# Patient Record
(demographics unavailable — no encounter records)

---

## 2018-05-18 NOTE — ED PHYSICIAN CHART
ED Chief Complaint/HPI





- Patient Information


Date Seen:: 05/18/18


Time Seen:: 01:15


Chief Complaint:: epigastric pain


History of Present Illness:: 





Patient developed epigastric pain at [id number] tonight.  He vomited 3 times.  

He had no diarrhea.  Last similar pain was 14-15 years ago.


Allergies:: 


 Allergies











Allergy/AdvReac Type Severity Reaction Status Date / Time


 


No Known Allergies Allergy   Verified 05/18/18 01:16











Vitals:: 


 Vital Signs - 8 hr











  05/18/18





  01:10


 


Temp 97.6 F


 


HR 91


 


RR 17


 


/87


 


O2 Sat % 97











Historian:: Patient


Review:: Nurse's Note Reviewed





ED Review of Systems





- Review of Systems


General/Constitutional: No fever, No chills, No weight loss, No weakness, No 

diaphoresis, No edema, No loss of appetite


Skin: No skin lesions, No rash, No bruising


Head: No headache, No light-headedness


Eyes: No loss of vision, No pain, No diplopia


ENT: No earache, No nasal drainage, No sore throat, No tinnitus


Neck: No neck pain, No swelling, No thyromegaly, No stiffness, No mass noted


Cardio Vascular: No chest pain, No palpitations, No PND, No orthopnea, No edema


Pulmonary: No SOB, No cough, No sputum, No wheezing


GI: Nausea, Vomiting, Diarrhea, No pain, No melena, No hematochezia, No 

constipation, No hematemesis


G/U: No dysuria, No frequency, No hematuria


Musculoskeletal: No bone or joint pain, No back pain, No muscle pain


Endocrine: No polyuria, No polydipsia


Psychiatric: No prior psych history, No depression, No anxiety, No suicidal 

ideation


Hematopoietic: No bruising, No lymphadenopathy


Allergic/Immuno: No urticaria, No angioedema


Neurological: No syncope, No focal symptoms, No weakness, No paresthesia, No 

headache, No seizure, No dizziness, No confusion, No vertigo





ED Past Medical History





- Past Medical History


Past Medical History: No significant medical hx


Family History: Diabetes Melitus


Social History: Non Smoker, No Alcohol, Other (patient quit smoking 3 years ago)


Surgical History: None


Psychiatricy History: None





Family Medical History





- Family Member


  ** Mother


History Unknown: Yes





  ** Brother


Hx Family Diabetes: Yes





ED Physical Exam





- Physical Examination


General/Constitutional: Awake, Well-developed, well-nourished, Alert, GCS 15, 

Non-toxic appearing, Ambulatory


Other Gen/Cons comments:: 





Retching


Head: Atraumatic


Eyes: Lids, conjuctiva normal, PERRL, EOMI


Skin: Nl inspection, No rash, No skin lesions, No ecchymosis, Well hydrated, No 

lymphadenopathy


ENMT: External ears, nose nl, Nasal exam nl, Lips, teeth, gums nl


Neck: Nontender, Full ROM w/o pain, No JVD, No nuchal rigidity, No bruit, No 

mass, No stridor


Respiratory: Nl effort/Exclusion, Clear to Auscultation, No Wheeze/Rhonchi/Rales


Cardio Vascular: RRR, No murmur, gallop, rubs, NL S1 S2


GI: No tenderness/rebounding/guarding


Other GI comments:: 





Epigastric and periumbilical tenderness


: No CVA tenderness


Extremities: No tenderness or effusion, Full ROM, normal strength in all 

extremities, No edema, Normal digits & nails


Neuro/Psych: Alert/oriented, Normal sensory exam, Normal motor strength, 

Judgement/insight normal, Mood normal, Normal gait, No focal deficits


Misc: Normal back, No paraspinal tenderness





ED Labs/Radiology/EKG Results





- Lab Results


Results: 





 Laboratory Results - last 24 hr











  05/18/18 05/18/18 05/18/18





  01:25 01:25 01:25


 


WBC  10.7  


 


RBC  5.34  


 


Hgb  15.5  


 


Hct  46.6  


 


MCV  87.4  


 


MCH  29.0  


 


MCHC Differential  33.2  


 


RDW  12.3  


 


Plt Count  227  


 


MPV  7.5  


 


Neutrophils %  72.5  


 


Lymphocytes %  21.4  


 


Monocytes %  3.9  


 


Eosinophils %  1.2  


 


Basophils %  1.0  


 


Sodium   138 


 


Potassium   3.5 


 


Chloride   101 


 


Carbon Dioxide   27.8 


 


Anion Gap   12.7 


 


BUN   18 


 


Creatinine   0.9 


 


Est GFR ( Amer)   > 60.0 


 


Est GFR (Non-Af Amer)   > 60.0 


 


BUN/Creatinine Ratio   20.0 


 


Glucose   113 H 


 


Calcium   10.3 


 


Troponin I    < 0.01 L


 


Lipase   15 


 


Urine Source   


 


Urine Color   


 


Urine Clarity   


 


Urine pH   


 


Ur Specific Gravity   


 


Urine Protein   


 


Urine Glucose (UA)   


 


Urine Ketones   


 


Urine Blood   


 


Urine Nitrate   


 


Urine Bilirubin   


 


Urine Urobilinogen   


 


Ur Leukocyte Esterase   


 


Urine RBC   


 


Urine WBC   


 


Ur Epithelial Cells   


 


Urine Bacteria   














  05/18/18





  02:15


 


WBC 


 


RBC 


 


Hgb 


 


Hct 


 


MCV 


 


MCH 


 


MCHC Differential 


 


RDW 


 


Plt Count 


 


MPV 


 


Neutrophils % 


 


Lymphocytes % 


 


Monocytes % 


 


Eosinophils % 


 


Basophils % 


 


Sodium 


 


Potassium 


 


Chloride 


 


Carbon Dioxide 


 


Anion Gap 


 


BUN 


 


Creatinine 


 


Est GFR ( Amer) 


 


Est GFR (Non-Af Amer) 


 


BUN/Creatinine Ratio 


 


Glucose 


 


Calcium 


 


Troponin I 


 


Lipase 


 


Urine Source  RANDOM


 


Urine Color  YELLOW


 


Urine Clarity  CLEAR


 


Urine pH  8.0


 


Ur Specific Gravity  1.015


 


Urine Protein  NEGATIVE


 


Urine Glucose (UA)  NEGATIVE


 


Urine Ketones  >=80 H


 


Urine Blood  NEGATIVE


 


Urine Nitrate  NEGATIVE


 


Urine Bilirubin  NEGATIVE


 


Urine Urobilinogen  0.2


 


Ur Leukocyte Esterase  NEGATIVE


 


Urine RBC  0-2 H


 


Urine WBC  0-2


 


Ur Epithelial Cells  RARE


 


Urine Bacteria  OCCASIONAL














- EKG Interpretations


Rate & Rhythm: normal sinus rhythm with a rate 84;


Axis: normal


Comments:: 


Q in lead 3 and 1 mm of ST elevation in V2 and avF





ED Assessment





- Assessment


General Assessment: 





Repeat EKG at 0251 was essentially unchanged from the first EKG.  Patient now 

sleeping after receiving 1 mg of Dilaudid intravenously.





ED Septic Shock





- .


Is Septic Shock (SBP<90, OR Lactate>4 mmol\L) present?: No





- <6hrs of presentation:


Vital Signs: 


 Vital Signs - 8 hr











  05/18/18





  01:10


 


Temp 97.6 F


 


HR 91


 


RR 17


 


/87


 


O2 Sat % 97














ED Reassessment (Disposition)





- Reassessment


Reassessment:: 





Patient states he had slight recurrence of pain at about 0410 and 0450 but at 

0635 was pain-free and had no abdominal tenderness.


Reassessment Condition:: Improved





- Diagnosis


Diagnosis:: 





Gastritis





- Aftercare/Follow up Instructions


Aftercare/Follow-Up Instructions:: Refer to Discharge Instructions





- Patient Disposition


Discharge/Transfer:: Home


Condition at Disposition:: Stable, Improved